# Patient Record
Sex: MALE
[De-identification: names, ages, dates, MRNs, and addresses within clinical notes are randomized per-mention and may not be internally consistent; named-entity substitution may affect disease eponyms.]

---

## 2021-04-13 ENCOUNTER — DASHBOARD ENCOUNTERS (OUTPATIENT)
Age: 21
End: 2021-04-13

## 2021-04-13 ENCOUNTER — OFFICE VISIT (OUTPATIENT)
Dept: URBAN - METROPOLITAN AREA CLINIC 92 | Facility: CLINIC | Age: 21
End: 2021-04-13
Payer: SELF-PAY

## 2021-04-13 VITALS
SYSTOLIC BLOOD PRESSURE: 145 MMHG | HEART RATE: 81 BPM | BODY MASS INDEX: 18.77 KG/M2 | TEMPERATURE: 98.1 F | WEIGHT: 151 LBS | HEIGHT: 75 IN | DIASTOLIC BLOOD PRESSURE: 99 MMHG

## 2021-04-13 DIAGNOSIS — F12.188 CANNABIS HYPEREMESIS SYNDROME CONCURRENT WITH AND DUE TO CANNABIS ABUSE: ICD-10-CM

## 2021-04-13 PROBLEM — 11047881000119101: Status: ACTIVE | Noted: 2021-04-13

## 2021-04-13 PROCEDURE — 99204 OFFICE O/P NEW MOD 45 MIN: CPT | Performed by: INTERNAL MEDICINE

## 2021-04-13 RX ORDER — ONDANSETRON HYDROCHLORIDE 4 MG/1
1 TABLET AS NEEDED TABLET, FILM COATED ORAL TID
Qty: 42 TABLET | Refills: 1 | OUTPATIENT
Start: 2021-04-13

## 2021-04-13 NOTE — HPI-TODAY'S VISIT:
Patient seen today for plaint of episodic nausea and vomiting and diffuse abdominal pain.  Emesis is mostly nonbloody and nonbilious.  He has had several episodes since last year.  Each episode last approximately 4 to 5 days.  This current episode is lasted 5 days so far.  Multiple ER visits which have resulted in normal lab work and abdominal x-ray.  He denies any GI bleeding, fevers or chills, diarrhea or constipation.  He does use marijuana and Percocet daily.  He does have improvement of nausea with very hot showers.  He does spend several hours a day in the shower.  Previously diagnosed through the ER with cannabis hyperemesis syndrome however has not completely discontinued as he resumes marijuana usage once his symptoms resolved.  No previous EGD